# Patient Record
Sex: FEMALE | Race: WHITE | NOT HISPANIC OR LATINO | Employment: UNEMPLOYED | URBAN - METROPOLITAN AREA
[De-identification: names, ages, dates, MRNs, and addresses within clinical notes are randomized per-mention and may not be internally consistent; named-entity substitution may affect disease eponyms.]

---

## 2017-04-12 ENCOUNTER — OFFICE VISIT (OUTPATIENT)
Dept: ORTHOPEDIC SURGERY | Facility: CLINIC | Age: 62
End: 2017-04-12

## 2017-04-12 DIAGNOSIS — M16.11 PRIMARY OSTEOARTHRITIS OF RIGHT HIP: Primary | ICD-10-CM

## 2017-04-12 PROCEDURE — 99213 OFFICE O/P EST LOW 20 MIN: CPT | Performed by: ORTHOPAEDIC SURGERY

## 2017-04-12 RX ORDER — LEVOTHYROXINE SODIUM 0.15 MG/1
150 TABLET ORAL DAILY
COMMUNITY
Start: 2017-02-10

## 2017-04-12 RX ORDER — ERGOCALCIFEROL 1.25 MG/1
50000 CAPSULE ORAL
COMMUNITY
Start: 2017-04-07

## 2017-04-12 RX ORDER — TRAMADOL HYDROCHLORIDE 50 MG/1
TABLET ORAL
COMMUNITY
Start: 2017-04-07 | End: 2017-05-02

## 2017-04-12 RX ORDER — DULOXETIN HYDROCHLORIDE 60 MG/1
CAPSULE, DELAYED RELEASE ORAL
Status: ON HOLD | COMMUNITY
Start: 2017-04-07 | End: 2017-05-16 | Stop reason: ALTCHOICE

## 2017-04-12 RX ORDER — CLOTRIMAZOLE 1 %
CREAM (GRAM) TOPICAL DAILY PRN
COMMUNITY
Start: 2017-03-17

## 2017-04-12 RX ORDER — CYCLOBENZAPRINE HCL 10 MG
TABLET ORAL
COMMUNITY
Start: 2017-04-07 | End: 2017-05-02

## 2017-04-12 RX ORDER — GABAPENTIN 600 MG/1
600 TABLET ORAL 4 TIMES DAILY
COMMUNITY
Start: 2017-04-07 | End: 2017-06-01 | Stop reason: SDUPTHER

## 2017-04-12 NOTE — PROGRESS NOTES
Subjective: Right hip pain     Patient ID: Anderson Hernandez is a 62 y.o. female.    Chief Complaint:    History of Present Illness patient known to me having previously treated for osteoarthritis of the right hip that failed to respond to nonoperative management is scheduled for total hip arthroplasty when she had a change of heart and cancel the surgery.  She since moved to [place] but her pain is persisted despite all treatment modalities and she presents today to schedule the surgery.  Again she states she canceled the last time because she is uncertain as to whether she wanted to proceed with the surgery.  His I told her the decision to proceed is hers to make.  She now is ready to proceed with surgery as she states she has pain constantly in that hip with activity and at rest describing is anywhere from 8-10 out of 10.  Ambulates with an outpouching gait using a cane.  Pain is quite disabling interfering with any activity that she wishes proceed with.       Social History     Occupational History   • Not on file.     Social History Main Topics   • Smoking status: Never Smoker   • Smokeless tobacco: Never Used   • Alcohol use No   • Drug use: No   • Sexual activity: Defer      Review of Systems   Constitutional: Negative for chills, diaphoresis, fever and unexpected weight change.   HENT: Negative for hearing loss, nosebleeds, sore throat and tinnitus.    Eyes: Negative for pain and visual disturbance.   Respiratory: Negative for cough, shortness of breath and wheezing.    Cardiovascular: Negative for chest pain and palpitations.   Gastrointestinal: Negative for abdominal pain, diarrhea, nausea and vomiting.   Endocrine: Negative for cold intolerance, heat intolerance and polydipsia.   Genitourinary: Negative for difficulty urinating, dysuria and hematuria.   Musculoskeletal: Positive for arthralgias and myalgias. Negative for joint swelling.   Skin: Negative for rash and wound.   Allergic/Immunologic: Negative  for environmental allergies.   Neurological: Negative for dizziness, syncope and numbness.   Hematological: Does not bruise/bleed easily.   Psychiatric/Behavioral: Negative for dysphoric mood and sleep disturbance. The patient is not nervous/anxious.    All other systems reviewed and are negative.        Past Medical History:   Diagnosis Date   • Fibromyalgia    • Knee pain    • Shoulder pain    • Stomach ulcer    • Tendinitis of knee      Past Surgical History:   Procedure Laterality Date   • CARPAL TUNNEL RELEASE     • GALLBLADDER SURGERY     • KNEE SURGERY     • PARTIAL HYSTERECTOMY       Family History   Problem Relation Age of Onset   • Lung disease Father    • Cancer Father    • Cancer Sister    • Lung disease Brother    • Cancer Brother    • Broken bones Maternal Grandmother          Objective:  There were no vitals filed for this visit.  There were no vitals filed for this visit.  There is no height or weight on file to calculate BMI.       Ortho Exam  I reviewed the x-rays of the pelvis that were taken previously again shows end-stage degenerative arthritis of the right hip.  Exam shows marked pain with internal rotation which is less than 10°.  External rotation is approximate 40° but painful.  Flexion about 60° with mild pain 0° of extension.  No sensory loss in that right lower extremity.  Calf is nontender and shows no swelling.  His good distal pulses no motor deficit no sensory loss in the lower extremity.  Her hip abductors measure 4 out of 5 as does her quads.    Assessment:       1. Primary osteoarthritis of right hip          Plan:      discussed the surgery with her again in detail regarding risk and benefits length of recovery and she agrees to proceed tenderness set up for May 16      Work Status:    ABDIRIZAK query complete.    Orders:  No orders of the defined types were placed in this encounter.      Medications:  New Medications Ordered This Visit   Medications   • gabapentin (NEURONTIN) 600  MG tablet   • vitamin D (ERGOCALCIFEROL) 71753 UNITS capsule capsule   • DULoxetine (CYMBALTA) 60 MG capsule   • cyclobenzaprine (FLEXERIL) 10 MG tablet   • clotrimazole (LOTRIMIN) 1 % cream   • traMADol (ULTRAM) 50 MG tablet   • levothyroxine (SYNTHROID, LEVOTHROID) 150 MCG tablet       Followup:  Return in about 33 days (around 5/15/2017).          Dragon transcription disclaimer     Much of this encounter note is an electronic transcription/translation of spoken language to printed text. The electronic translation of spoken language may permit erroneous, or at times, nonsensical words or phrases to be inadvertently transcribed. Although I have reviewed the note for such errors, some may still exist.

## 2017-04-15 ENCOUNTER — PREP FOR SURGERY (OUTPATIENT)
Dept: ORTHOPEDIC SURGERY | Facility: CLINIC | Age: 62
End: 2017-04-15

## 2017-04-15 DIAGNOSIS — M16.11 OSTEOARTHRITIS OF ONE HIP, RIGHT: Primary | ICD-10-CM

## 2017-04-15 RX ORDER — OXYCODONE HCL 10 MG/1
10 TABLET, FILM COATED, EXTENDED RELEASE ORAL ONCE
Status: CANCELLED | OUTPATIENT
Start: 2017-05-16

## 2017-04-15 RX ORDER — ACETAMINOPHEN 10 MG/ML
1000 INJECTION, SOLUTION INTRAVENOUS ONCE
Status: CANCELLED | OUTPATIENT
Start: 2017-05-16

## 2017-04-15 RX ORDER — CELECOXIB 100 MG/1
400 CAPSULE ORAL ONCE
Status: CANCELLED | OUTPATIENT
Start: 2017-05-16

## 2017-04-15 RX ORDER — PREGABALIN 75 MG/1
150 CAPSULE ORAL ONCE
Status: CANCELLED | OUTPATIENT
Start: 2017-05-16

## 2017-04-15 RX ORDER — PANTOPRAZOLE SODIUM 20 MG/1
20 TABLET, DELAYED RELEASE ORAL ONCE
Status: CANCELLED | OUTPATIENT
Start: 2017-05-16

## 2017-04-25 ENCOUNTER — PREP FOR SURGERY (OUTPATIENT)
Dept: ORTHOPEDIC SURGERY | Facility: CLINIC | Age: 62
End: 2017-04-25

## 2017-04-25 DIAGNOSIS — M16.11 OSTEOARTHRITIS OF ONE HIP, RIGHT: Primary | ICD-10-CM

## 2017-05-02 ENCOUNTER — APPOINTMENT (OUTPATIENT)
Dept: PREADMISSION TESTING | Facility: HOSPITAL | Age: 62
End: 2017-05-02

## 2017-05-02 ENCOUNTER — HOSPITAL ENCOUNTER (OUTPATIENT)
Dept: GENERAL RADIOLOGY | Facility: HOSPITAL | Age: 62
Discharge: HOME | End: 2017-05-02
Admitting: ORTHOPAEDIC SURGERY

## 2017-05-02 VITALS
DIASTOLIC BLOOD PRESSURE: 84 MMHG | RESPIRATION RATE: 16 BRPM | HEIGHT: 60 IN | BODY MASS INDEX: 35.93 KG/M2 | SYSTOLIC BLOOD PRESSURE: 149 MMHG | WEIGHT: 183 LBS | HEART RATE: 73 BPM | OXYGEN SATURATION: 99 %

## 2017-05-02 DIAGNOSIS — M16.11 PRIMARY OSTEOARTHRITIS OF RIGHT HIP: Primary | ICD-10-CM

## 2017-05-02 DIAGNOSIS — M16.11 OSTEOARTHRITIS OF ONE HIP, RIGHT: ICD-10-CM

## 2017-05-02 LAB
ABO GROUP BLD: NORMAL
ABO GROUP BLD: NORMAL
ANION GAP SERPL CALCULATED.3IONS-SCNC: 13.1 MMOL/L
BACTERIA UR QL AUTO: ABNORMAL /HPF
BASOPHILS # BLD AUTO: 0.07 10*3/MM3 (ref 0–0.2)
BASOPHILS NFR BLD AUTO: 1.6 % (ref 0–2)
BILIRUB UR QL STRIP: NEGATIVE
BLD GP AB SCN SERPL QL: NEGATIVE
BUN BLD-MCNC: 8 MG/DL (ref 8–23)
BUN/CREAT SERPL: 10.4 (ref 7–25)
CALCIUM SPEC-SCNC: 9 MG/DL (ref 8.8–10.5)
CHLORIDE SERPL-SCNC: 102 MMOL/L (ref 98–107)
CLARITY UR: CLEAR
CO2 SERPL-SCNC: 25.9 MMOL/L (ref 22–29)
COLOR UR: YELLOW
CREAT BLD-MCNC: 0.77 MG/DL (ref 0.57–1)
DEPRECATED RDW RBC AUTO: 40.9 FL (ref 37–54)
EOSINOPHIL # BLD AUTO: 0.22 10*3/MM3 (ref 0.1–0.3)
EOSINOPHIL NFR BLD AUTO: 4.9 % (ref 0–4)
ERYTHROCYTE [DISTWIDTH] IN BLOOD BY AUTOMATED COUNT: 12.3 % (ref 11.5–14.5)
GFR SERPL CREATININE-BSD FRML MDRD: 76 ML/MIN/1.73
GLUCOSE BLD-MCNC: 115 MG/DL (ref 65–99)
GLUCOSE UR STRIP-MCNC: NEGATIVE MG/DL
HCT VFR BLD AUTO: 40.3 % (ref 37–47)
HGB BLD-MCNC: 13.9 G/DL (ref 12–16)
HGB UR QL STRIP.AUTO: NEGATIVE
HYALINE CASTS UR QL AUTO: ABNORMAL /LPF
IMM GRANULOCYTES # BLD: 0.01 10*3/MM3 (ref 0–0.03)
IMM GRANULOCYTES NFR BLD: 0.2 % (ref 0–0.5)
INR PPP: 1.02 (ref 0.9–1.1)
KETONES UR QL STRIP: NEGATIVE
LEUKOCYTE ESTERASE UR QL STRIP.AUTO: ABNORMAL
LYMPHOCYTES # BLD AUTO: 1.3 10*3/MM3 (ref 0.6–4.8)
LYMPHOCYTES NFR BLD AUTO: 29.1 % (ref 20–45)
MCH RBC QN AUTO: 31.2 PG (ref 27–31)
MCHC RBC AUTO-ENTMCNC: 34.5 G/DL (ref 31–37)
MCV RBC AUTO: 90.4 FL (ref 81–99)
MONOCYTES # BLD AUTO: 0.36 10*3/MM3 (ref 0–1)
MONOCYTES NFR BLD AUTO: 8.1 % (ref 3–8)
NEUTROPHILS # BLD AUTO: 2.5 10*3/MM3 (ref 1.5–8.3)
NEUTROPHILS NFR BLD AUTO: 56.1 % (ref 45–70)
NITRITE UR QL STRIP: NEGATIVE
NRBC BLD MANUAL-RTO: 0 /100 WBC (ref 0–0)
PH UR STRIP.AUTO: 5.5 [PH] (ref 4.5–8)
PLATELET # BLD AUTO: 234 10*3/MM3 (ref 140–500)
PMV BLD AUTO: 10.6 FL (ref 7.4–10.4)
POTASSIUM BLD-SCNC: 3.8 MMOL/L (ref 3.5–5.2)
PROT UR QL STRIP: NEGATIVE
PROTHROMBIN TIME: 13.4 SECONDS (ref 12.1–15)
RBC # BLD AUTO: 4.46 10*6/MM3 (ref 4.2–5.4)
RBC # UR: ABNORMAL /HPF
REF LAB TEST METHOD: ABNORMAL
RH BLD: POSITIVE
RH BLD: POSITIVE
SODIUM BLD-SCNC: 141 MMOL/L (ref 136–145)
SP GR UR STRIP: 1.01 (ref 1–1.03)
SQUAMOUS #/AREA URNS HPF: ABNORMAL /HPF
UROBILINOGEN UR QL STRIP: ABNORMAL
WBC NRBC COR # BLD: 4.46 10*3/MM3 (ref 4.8–10.8)
WBC UR QL AUTO: ABNORMAL /HPF

## 2017-05-02 PROCEDURE — 80048 BASIC METABOLIC PNL TOTAL CA: CPT | Performed by: ORTHOPAEDIC SURGERY

## 2017-05-02 PROCEDURE — 86900 BLOOD TYPING SEROLOGIC ABO: CPT

## 2017-05-02 PROCEDURE — 87081 CULTURE SCREEN ONLY: CPT | Performed by: ORTHOPAEDIC SURGERY

## 2017-05-02 PROCEDURE — 85610 PROTHROMBIN TIME: CPT | Performed by: ORTHOPAEDIC SURGERY

## 2017-05-02 PROCEDURE — 71020 HC CHEST PA AND LATERAL: CPT

## 2017-05-02 PROCEDURE — 86850 RBC ANTIBODY SCREEN: CPT | Performed by: ORTHOPAEDIC SURGERY

## 2017-05-02 PROCEDURE — 86901 BLOOD TYPING SEROLOGIC RH(D): CPT

## 2017-05-02 PROCEDURE — 85025 COMPLETE CBC W/AUTO DIFF WBC: CPT | Performed by: ORTHOPAEDIC SURGERY

## 2017-05-02 PROCEDURE — 93010 ELECTROCARDIOGRAM REPORT: CPT | Performed by: INTERNAL MEDICINE

## 2017-05-02 PROCEDURE — 86900 BLOOD TYPING SEROLOGIC ABO: CPT | Performed by: ORTHOPAEDIC SURGERY

## 2017-05-02 PROCEDURE — 93005 ELECTROCARDIOGRAM TRACING: CPT

## 2017-05-02 PROCEDURE — 81001 URINALYSIS AUTO W/SCOPE: CPT | Performed by: ORTHOPAEDIC SURGERY

## 2017-05-02 PROCEDURE — 36415 COLL VENOUS BLD VENIPUNCTURE: CPT

## 2017-05-02 PROCEDURE — 86901 BLOOD TYPING SEROLOGIC RH(D): CPT | Performed by: ORTHOPAEDIC SURGERY

## 2017-05-02 RX ORDER — PANTOPRAZOLE SODIUM 40 MG/1
40 TABLET, DELAYED RELEASE ORAL DAILY
COMMUNITY

## 2017-05-04 LAB — MRSA SPEC QL CULT: NORMAL

## 2017-05-15 ENCOUNTER — ANESTHESIA EVENT (OUTPATIENT)
Dept: PERIOP | Facility: HOSPITAL | Age: 62
End: 2017-05-15

## 2017-05-15 ENCOUNTER — OFFICE VISIT (OUTPATIENT)
Dept: ORTHOPEDIC SURGERY | Facility: CLINIC | Age: 62
End: 2017-05-15

## 2017-05-15 VITALS — BODY MASS INDEX: 35.14 KG/M2 | WEIGHT: 179 LBS | HEIGHT: 60 IN

## 2017-05-15 DIAGNOSIS — M16.11 OSTEOARTHRITIS OF ONE HIP, RIGHT: Primary | ICD-10-CM

## 2017-05-15 PROCEDURE — S0260 H&P FOR SURGERY: HCPCS | Performed by: ORTHOPAEDIC SURGERY

## 2017-05-16 ENCOUNTER — ANESTHESIA (OUTPATIENT)
Dept: PERIOP | Facility: HOSPITAL | Age: 62
End: 2017-05-16

## 2017-05-16 ENCOUNTER — HOSPITAL ENCOUNTER (INPATIENT)
Facility: HOSPITAL | Age: 62
LOS: 2 days | Discharge: HOME | End: 2017-05-18
Attending: ORTHOPAEDIC SURGERY | Admitting: ORTHOPAEDIC SURGERY

## 2017-05-16 ENCOUNTER — APPOINTMENT (OUTPATIENT)
Dept: GENERAL RADIOLOGY | Facility: HOSPITAL | Age: 62
End: 2017-05-16

## 2017-05-16 DIAGNOSIS — M16.11 OSTEOARTHRITIS OF ONE HIP, RIGHT: ICD-10-CM

## 2017-05-16 PROBLEM — M16.9 OSTEOARTHRITIS OF HIP: Status: ACTIVE | Noted: 2017-05-16

## 2017-05-16 PROCEDURE — 73501 X-RAY EXAM HIP UNI 1 VIEW: CPT

## 2017-05-16 PROCEDURE — 0SR904A REPLACEMENT OF RIGHT HIP JOINT WITH CERAMIC ON POLYETHYLENE SYNTHETIC SUBSTITUTE, UNCEMENTED, OPEN APPROACH: ICD-10-PCS | Performed by: ORTHOPAEDIC SURGERY

## 2017-05-16 PROCEDURE — 25010000002 DIPHENHYDRAMINE PER 50 MG: Performed by: NURSE ANESTHETIST, CERTIFIED REGISTERED

## 2017-05-16 PROCEDURE — 25010000002 PROPOFOL 10 MG/ML EMULSION: Performed by: NURSE ANESTHETIST, CERTIFIED REGISTERED

## 2017-05-16 PROCEDURE — 25010000002 ONDANSETRON PER 1 MG

## 2017-05-16 PROCEDURE — C1776 JOINT DEVICE (IMPLANTABLE): HCPCS | Performed by: ORTHOPAEDIC SURGERY

## 2017-05-16 PROCEDURE — 25010000002 MIDAZOLAM PER 1 MG: Performed by: NURSE ANESTHETIST, CERTIFIED REGISTERED

## 2017-05-16 PROCEDURE — 27130 TOTAL HIP ARTHROPLASTY: CPT | Performed by: ORTHOPAEDIC SURGERY

## 2017-05-16 PROCEDURE — 99232 SBSQ HOSP IP/OBS MODERATE 35: CPT | Performed by: INTERNAL MEDICINE

## 2017-05-16 PROCEDURE — 25010000002 DEXAMETHASONE PER 1 MG: Performed by: NURSE ANESTHETIST, CERTIFIED REGISTERED

## 2017-05-16 PROCEDURE — 25010000003 CEFAZOLIN PER 500 MG: Performed by: ORTHOPAEDIC SURGERY

## 2017-05-16 PROCEDURE — 25010000003 CEFAZOLIN PER 500 MG: Performed by: NURSE ANESTHETIST, CERTIFIED REGISTERED

## 2017-05-16 PROCEDURE — 25010000002 ONDANSETRON PER 1 MG: Performed by: ORTHOPAEDIC SURGERY

## 2017-05-16 PROCEDURE — 94799 UNLISTED PULMONARY SVC/PX: CPT

## 2017-05-16 DEVICE — PINNACLE HIP SOLUTIONS ALTRX POLYETHYLENE ACETABULAR LINER NEUTRAL 32MM ID 50MM OD
Type: IMPLANTABLE DEVICE | Site: ACETABULUM | Status: FUNCTIONAL
Brand: PINNACLE ALTRX

## 2017-05-16 DEVICE — BIOLOX DELTA CERAMIC FEMORAL HEAD 32MM DIA +5.0 12/14 TAPER
Type: IMPLANTABLE DEVICE | Site: HIP | Status: FUNCTIONAL
Brand: BIOLOX DELTA

## 2017-05-16 DEVICE — TOTL HIP COA DEPUY 9641334: Type: IMPLANTABLE DEVICE | Site: ACETABULUM | Status: FUNCTIONAL

## 2017-05-16 DEVICE — PINNACLE GRIPTION ACETABULAR SHELL SECTOR 50MM OD
Type: IMPLANTABLE DEVICE | Site: ACETABULUM | Status: FUNCTIONAL
Brand: PINNACLE GRIPTION

## 2017-05-16 DEVICE — SUMMIT FEMORAL STEM 12/14 TAPER TAPERED W/DUOFIX HA SIZE 4 STD 140MM
Type: IMPLANTABLE DEVICE | Site: TROCHANTER | Status: FUNCTIONAL
Brand: SUMMIT

## 2017-05-16 DEVICE — PINNACLE CANCELLOUS BONE SCREW 6.5MM X 25MM
Type: IMPLANTABLE DEVICE | Site: ACETABULUM | Status: FUNCTIONAL
Brand: PINNACLE

## 2017-05-16 RX ORDER — SCOLOPAMINE TRANSDERMAL SYSTEM 1 MG/1
1 PATCH, EXTENDED RELEASE TRANSDERMAL ONCE
Status: DISCONTINUED | OUTPATIENT
Start: 2017-05-16 | End: 2017-05-17

## 2017-05-16 RX ORDER — SENNA AND DOCUSATE SODIUM 50; 8.6 MG/1; MG/1
2 TABLET, FILM COATED ORAL 2 TIMES DAILY
Status: DISCONTINUED | OUTPATIENT
Start: 2017-05-16 | End: 2017-05-18 | Stop reason: HOSPADM

## 2017-05-16 RX ORDER — HYDROMORPHONE HCL 110MG/55ML
0.5 PATIENT CONTROLLED ANALGESIA SYRINGE INTRAVENOUS
Status: CANCELLED | OUTPATIENT
Start: 2017-05-16

## 2017-05-16 RX ORDER — DIPHENHYDRAMINE HYDROCHLORIDE 50 MG/ML
INJECTION INTRAMUSCULAR; INTRAVENOUS AS NEEDED
Status: DISCONTINUED | OUTPATIENT
Start: 2017-05-16 | End: 2017-05-16 | Stop reason: SURG

## 2017-05-16 RX ORDER — OXYCODONE HCL 10 MG/1
10 TABLET, FILM COATED, EXTENDED RELEASE ORAL ONCE
Status: COMPLETED | OUTPATIENT
Start: 2017-05-16 | End: 2017-05-16

## 2017-05-16 RX ORDER — MAGNESIUM HYDROXIDE 1200 MG/15ML
LIQUID ORAL AS NEEDED
Status: DISCONTINUED | OUTPATIENT
Start: 2017-05-16 | End: 2017-05-16 | Stop reason: HOSPADM

## 2017-05-16 RX ORDER — NALOXONE HCL 0.4 MG/ML
0.4 VIAL (ML) INJECTION AS NEEDED
Status: DISCONTINUED | OUTPATIENT
Start: 2017-05-16 | End: 2017-05-16 | Stop reason: HOSPADM

## 2017-05-16 RX ORDER — BISACODYL 5 MG/1
10 TABLET, DELAYED RELEASE ORAL DAILY PRN
Status: DISCONTINUED | OUTPATIENT
Start: 2017-05-16 | End: 2017-05-18 | Stop reason: HOSPADM

## 2017-05-16 RX ORDER — PREGABALIN 75 MG/1
150 CAPSULE ORAL ONCE
Status: COMPLETED | OUTPATIENT
Start: 2017-05-16 | End: 2017-05-16

## 2017-05-16 RX ORDER — GABAPENTIN 300 MG/1
600 CAPSULE ORAL 4 TIMES DAILY
Status: DISCONTINUED | OUTPATIENT
Start: 2017-05-16 | End: 2017-05-18 | Stop reason: HOSPADM

## 2017-05-16 RX ORDER — SODIUM CHLORIDE 0.9 % (FLUSH) 0.9 %
1-10 SYRINGE (ML) INJECTION AS NEEDED
Status: DISCONTINUED | OUTPATIENT
Start: 2017-05-16 | End: 2017-05-16 | Stop reason: HOSPADM

## 2017-05-16 RX ORDER — ONDANSETRON 2 MG/ML
INJECTION INTRAMUSCULAR; INTRAVENOUS
Status: COMPLETED
Start: 2017-05-16 | End: 2017-05-16

## 2017-05-16 RX ORDER — PROPOFOL 10 MG/ML
VIAL (ML) INTRAVENOUS CONTINUOUS PRN
Status: DISCONTINUED | OUTPATIENT
Start: 2017-05-16 | End: 2017-05-16 | Stop reason: SURG

## 2017-05-16 RX ORDER — ASPIRIN 325 MG
325 TABLET ORAL EVERY 12 HOURS
Status: DISCONTINUED | OUTPATIENT
Start: 2017-05-16 | End: 2017-05-18 | Stop reason: HOSPADM

## 2017-05-16 RX ORDER — MIDAZOLAM HYDROCHLORIDE 1 MG/ML
2 INJECTION INTRAMUSCULAR; INTRAVENOUS
Status: DISCONTINUED | OUTPATIENT
Start: 2017-05-16 | End: 2017-05-16 | Stop reason: HOSPADM

## 2017-05-16 RX ORDER — LEVOTHYROXINE SODIUM 0.15 MG/1
150 TABLET ORAL DAILY
Status: DISCONTINUED | OUTPATIENT
Start: 2017-05-16 | End: 2017-05-18 | Stop reason: HOSPADM

## 2017-05-16 RX ORDER — NAPROXEN SODIUM 220 MG
220 TABLET ORAL AS NEEDED
COMMUNITY
End: 2017-05-18 | Stop reason: HOSPADM

## 2017-05-16 RX ORDER — TRAMADOL HYDROCHLORIDE 50 MG/1
50 TABLET ORAL EVERY 6 HOURS PRN
Status: CANCELLED | OUTPATIENT
Start: 2017-05-16 | End: 2017-05-26

## 2017-05-16 RX ORDER — CEFAZOLIN SODIUM 1 G/3ML
2 INJECTION, POWDER, FOR SOLUTION INTRAMUSCULAR; INTRAVENOUS ONCE
Status: DISCONTINUED | OUTPATIENT
Start: 2017-05-16 | End: 2017-05-16 | Stop reason: HOSPADM

## 2017-05-16 RX ORDER — ONDANSETRON 2 MG/ML
4 INJECTION INTRAMUSCULAR; INTRAVENOUS EVERY 6 HOURS PRN
Status: DISCONTINUED | OUTPATIENT
Start: 2017-05-16 | End: 2017-05-18 | Stop reason: HOSPADM

## 2017-05-16 RX ORDER — CELECOXIB 100 MG/1
400 CAPSULE ORAL ONCE
Status: COMPLETED | OUTPATIENT
Start: 2017-05-16 | End: 2017-05-16

## 2017-05-16 RX ORDER — ONDANSETRON 2 MG/ML
4 INJECTION INTRAMUSCULAR; INTRAVENOUS ONCE AS NEEDED
Status: DISCONTINUED | OUTPATIENT
Start: 2017-05-16 | End: 2017-05-16 | Stop reason: HOSPADM

## 2017-05-16 RX ORDER — LIDOCAINE HYDROCHLORIDE 10 MG/ML
0.5 INJECTION, SOLUTION EPIDURAL; INFILTRATION; INTRACAUDAL; PERINEURAL ONCE AS NEEDED
Status: COMPLETED | OUTPATIENT
Start: 2017-05-16 | End: 2017-05-16

## 2017-05-16 RX ORDER — ACETAMINOPHEN 10 MG/ML
1000 INJECTION, SOLUTION INTRAVENOUS ONCE
Status: COMPLETED | OUTPATIENT
Start: 2017-05-16 | End: 2017-05-16

## 2017-05-16 RX ORDER — FAMOTIDINE 10 MG/ML
20 INJECTION, SOLUTION INTRAVENOUS
Status: DISCONTINUED | OUTPATIENT
Start: 2017-05-16 | End: 2017-05-16 | Stop reason: HOSPADM

## 2017-05-16 RX ORDER — FLUTICASONE PROPIONATE 50 MCG
1 SPRAY, SUSPENSION (ML) NASAL AS NEEDED
Status: DISCONTINUED | OUTPATIENT
Start: 2017-05-16 | End: 2017-05-18 | Stop reason: HOSPADM

## 2017-05-16 RX ORDER — CEFAZOLIN SODIUM 1 G/3ML
INJECTION, POWDER, FOR SOLUTION INTRAMUSCULAR; INTRAVENOUS
Status: DISPENSED
Start: 2017-05-16 | End: 2017-05-16

## 2017-05-16 RX ORDER — BISACODYL 10 MG
10 SUPPOSITORY, RECTAL RECTAL DAILY PRN
Status: DISCONTINUED | OUTPATIENT
Start: 2017-05-16 | End: 2017-05-18 | Stop reason: HOSPADM

## 2017-05-16 RX ORDER — PANTOPRAZOLE SODIUM 20 MG/1
20 TABLET, DELAYED RELEASE ORAL ONCE
Status: COMPLETED | OUTPATIENT
Start: 2017-05-16 | End: 2017-05-16

## 2017-05-16 RX ORDER — ACETAMINOPHEN 500 MG
1000 TABLET ORAL EVERY 6 HOURS
Status: DISCONTINUED | OUTPATIENT
Start: 2017-05-16 | End: 2017-05-18 | Stop reason: HOSPADM

## 2017-05-16 RX ORDER — ONDANSETRON 2 MG/ML
4 INJECTION INTRAMUSCULAR; INTRAVENOUS ONCE AS NEEDED
Status: COMPLETED | OUTPATIENT
Start: 2017-05-16 | End: 2017-05-16

## 2017-05-16 RX ORDER — CLOTRIMAZOLE 1 %
CREAM (GRAM) TOPICAL DAILY PRN
Status: DISCONTINUED | OUTPATIENT
Start: 2017-05-16 | End: 2017-05-18 | Stop reason: HOSPADM

## 2017-05-16 RX ORDER — SODIUM CHLORIDE, SODIUM LACTATE, POTASSIUM CHLORIDE, CALCIUM CHLORIDE 600; 310; 30; 20 MG/100ML; MG/100ML; MG/100ML; MG/100ML
9 INJECTION, SOLUTION INTRAVENOUS CONTINUOUS
Status: DISCONTINUED | OUTPATIENT
Start: 2017-05-16 | End: 2017-05-16

## 2017-05-16 RX ORDER — DEXAMETHASONE SODIUM PHOSPHATE 4 MG/ML
8 INJECTION, SOLUTION INTRA-ARTICULAR; INTRALESIONAL; INTRAMUSCULAR; INTRAVENOUS; SOFT TISSUE ONCE
Status: COMPLETED | OUTPATIENT
Start: 2017-05-16 | End: 2017-05-16

## 2017-05-16 RX ORDER — MIDAZOLAM HYDROCHLORIDE 1 MG/ML
1 INJECTION INTRAMUSCULAR; INTRAVENOUS
Status: DISCONTINUED | OUTPATIENT
Start: 2017-05-16 | End: 2017-05-16 | Stop reason: HOSPADM

## 2017-05-16 RX ORDER — BUPIVACAINE HYDROCHLORIDE 5 MG/ML
INJECTION, SOLUTION EPIDURAL; INTRACAUDAL AS NEEDED
Status: DISCONTINUED | OUTPATIENT
Start: 2017-05-16 | End: 2017-05-16 | Stop reason: SURG

## 2017-05-16 RX ORDER — SODIUM CHLORIDE, SODIUM LACTATE, POTASSIUM CHLORIDE, CALCIUM CHLORIDE 600; 310; 30; 20 MG/100ML; MG/100ML; MG/100ML; MG/100ML
50 INJECTION, SOLUTION INTRAVENOUS CONTINUOUS
Status: DISCONTINUED | OUTPATIENT
Start: 2017-05-16 | End: 2017-05-16

## 2017-05-16 RX ORDER — PANTOPRAZOLE SODIUM 40 MG/1
40 TABLET, DELAYED RELEASE ORAL DAILY
Status: DISCONTINUED | OUTPATIENT
Start: 2017-05-16 | End: 2017-05-18 | Stop reason: HOSPADM

## 2017-05-16 RX ORDER — OXYCODONE HYDROCHLORIDE 5 MG/1
10 TABLET ORAL EVERY 4 HOURS PRN
Status: DISCONTINUED | OUTPATIENT
Start: 2017-05-16 | End: 2017-05-18 | Stop reason: HOSPADM

## 2017-05-16 RX ADMIN — FAMOTIDINE 20 MG: 10 INJECTION INTRAVENOUS at 08:28

## 2017-05-16 RX ADMIN — LIDOCAINE HYDROCHLORIDE 0.1 ML: 10 INJECTION, SOLUTION EPIDURAL; INFILTRATION; INTRACAUDAL; PERINEURAL at 06:50

## 2017-05-16 RX ADMIN — DEXAMETHASONE SODIUM PHOSPHATE 8 MG: 4 INJECTION, SOLUTION INTRAMUSCULAR; INTRAVENOUS at 08:28

## 2017-05-16 RX ADMIN — DIPHENHYDRAMINE HYDROCHLORIDE 12.5 MG: 50 INJECTION INTRAMUSCULAR; INTRAVENOUS at 10:20

## 2017-05-16 RX ADMIN — MIDAZOLAM HYDROCHLORIDE 1 MG: 1 INJECTION, SOLUTION INTRAMUSCULAR; INTRAVENOUS at 08:28

## 2017-05-16 RX ADMIN — PROPOFOL 25 MCG/KG/MIN: 10 INJECTION, EMULSION INTRAVENOUS at 10:36

## 2017-05-16 RX ADMIN — ACETAMINOPHEN 1000 MG: 10 INJECTION, SOLUTION INTRAVENOUS at 07:52

## 2017-05-16 RX ADMIN — PANTOPRAZOLE SODIUM 20 MG: 20 TABLET, DELAYED RELEASE ORAL at 07:01

## 2017-05-16 RX ADMIN — GABAPENTIN 600 MG: 300 CAPSULE ORAL at 21:38

## 2017-05-16 RX ADMIN — OXYCODONE HYDROCHLORIDE 10 MG: 5 TABLET ORAL at 14:07

## 2017-05-16 RX ADMIN — ACETAMINOPHEN 1000 MG: 500 TABLET, FILM COATED ORAL at 21:38

## 2017-05-16 RX ADMIN — DIPHENHYDRAMINE HYDROCHLORIDE 12.5 MG: 50 INJECTION INTRAMUSCULAR; INTRAVENOUS at 11:15

## 2017-05-16 RX ADMIN — OXYCODONE HYDROCHLORIDE 10 MG: 5 TABLET ORAL at 21:38

## 2017-05-16 RX ADMIN — CELECOXIB 400 MG: 100 CAPSULE ORAL at 07:00

## 2017-05-16 RX ADMIN — GABAPENTIN 600 MG: 300 CAPSULE ORAL at 17:39

## 2017-05-16 RX ADMIN — CEFAZOLIN SODIUM 2 G: 2 SOLUTION INTRAVENOUS at 10:00

## 2017-05-16 RX ADMIN — SODIUM CHLORIDE, POTASSIUM CHLORIDE, SODIUM LACTATE AND CALCIUM CHLORIDE: 600; 310; 30; 20 INJECTION, SOLUTION INTRAVENOUS at 09:30

## 2017-05-16 RX ADMIN — OXYCODONE HYDROCHLORIDE 10 MG: 5 TABLET ORAL at 17:38

## 2017-05-16 RX ADMIN — PANTOPRAZOLE SODIUM 40 MG: 40 TABLET, DELAYED RELEASE ORAL at 17:49

## 2017-05-16 RX ADMIN — SODIUM CHLORIDE 1000 MG: 9 INJECTION, SOLUTION INTRAVENOUS at 11:38

## 2017-05-16 RX ADMIN — SCOPALAMINE 1 PATCH: 1 PATCH, EXTENDED RELEASE TRANSDERMAL at 08:28

## 2017-05-16 RX ADMIN — ASPIRIN 325 MG: 325 TABLET, COATED ORAL at 17:39

## 2017-05-16 RX ADMIN — BUPIVACAINE HYDROCHLORIDE 3 ML: 5 INJECTION, SOLUTION EPIDURAL; INTRACAUDAL; PERINEURAL at 09:15

## 2017-05-16 RX ADMIN — ONDANSETRON 4 MG: 2 INJECTION INTRAMUSCULAR; INTRAVENOUS at 08:28

## 2017-05-16 RX ADMIN — DOCUSATE SODIUM AND SENNOSIDES 2 TABLET: 8.6; 5 TABLET, FILM COATED ORAL at 17:39

## 2017-05-16 RX ADMIN — CEFAZOLIN 2 G: 1 INJECTION, POWDER, FOR SOLUTION INTRAMUSCULAR; INTRAVENOUS at 17:40

## 2017-05-16 RX ADMIN — OXYCODONE HYDROCHLORIDE 10 MG: 10 TABLET, FILM COATED, EXTENDED RELEASE ORAL at 07:00

## 2017-05-16 RX ADMIN — SODIUM CHLORIDE 1000 MG: 9 INJECTION, SOLUTION INTRAVENOUS at 10:15

## 2017-05-16 RX ADMIN — ONDANSETRON 4 MG: 2 INJECTION, SOLUTION INTRAMUSCULAR; INTRAVENOUS at 15:44

## 2017-05-16 RX ADMIN — LEVOTHYROXINE SODIUM 150 MCG: 150 TABLET ORAL at 17:49

## 2017-05-16 RX ADMIN — PREGABALIN 150 MG: 75 CAPSULE ORAL at 07:00

## 2017-05-16 RX ADMIN — SODIUM CHLORIDE, POTASSIUM CHLORIDE, SODIUM LACTATE AND CALCIUM CHLORIDE 9 ML/HR: 600; 310; 30; 20 INJECTION, SOLUTION INTRAVENOUS at 06:50

## 2017-05-16 RX ADMIN — ONDANSETRON 4 MG: 2 INJECTION, SOLUTION INTRAMUSCULAR; INTRAVENOUS at 08:28

## 2017-05-17 LAB
APTT PPP: 32 SECONDS (ref 24.3–38.1)
DEPRECATED RDW RBC AUTO: 41.4 FL (ref 37–54)
ERYTHROCYTE [DISTWIDTH] IN BLOOD BY AUTOMATED COUNT: 12.4 % (ref 11.5–14.5)
HCT VFR BLD AUTO: 35.2 % (ref 37–47)
HGB BLD-MCNC: 11.7 G/DL (ref 12–16)
LARGE PLATELETS: ABNORMAL
LYMPHOCYTES # BLD MANUAL: 1.04 10*3/MM3 (ref 0.6–4.8)
LYMPHOCYTES NFR BLD MANUAL: 11 % (ref 3–8)
LYMPHOCYTES NFR BLD MANUAL: 8 % (ref 20–45)
MCH RBC QN AUTO: 30.5 PG (ref 27–31)
MCHC RBC AUTO-ENTMCNC: 33.2 G/DL (ref 31–37)
MCV RBC AUTO: 91.7 FL (ref 81–99)
MONOCYTES # BLD AUTO: 1.42 10*3/MM3 (ref 0–1)
NEUTROPHILS # BLD AUTO: 10.49 10*3/MM3 (ref 1.5–8.3)
NEUTROPHILS NFR BLD MANUAL: 77 % (ref 45–70)
NEUTS BAND NFR BLD MANUAL: 4 % (ref 0–5)
PLAT MORPH BLD: NORMAL
PLATELET # BLD AUTO: 238 10*3/MM3 (ref 140–500)
PMV BLD AUTO: 11.2 FL (ref 7.4–10.4)
RBC # BLD AUTO: 3.84 10*6/MM3 (ref 4.2–5.4)
RBC MORPH BLD: NORMAL
WBC MORPH BLD: NORMAL
WBC NRBC COR # BLD: 12.95 10*3/MM3 (ref 4.8–10.8)

## 2017-05-17 PROCEDURE — 85007 BL SMEAR W/DIFF WBC COUNT: CPT | Performed by: ORTHOPAEDIC SURGERY

## 2017-05-17 PROCEDURE — 97116 GAIT TRAINING THERAPY: CPT

## 2017-05-17 PROCEDURE — 99232 SBSQ HOSP IP/OBS MODERATE 35: CPT | Performed by: INTERNAL MEDICINE

## 2017-05-17 PROCEDURE — 25010000003 CEFAZOLIN PER 500 MG: Performed by: ORTHOPAEDIC SURGERY

## 2017-05-17 PROCEDURE — 85025 COMPLETE CBC W/AUTO DIFF WBC: CPT | Performed by: ORTHOPAEDIC SURGERY

## 2017-05-17 PROCEDURE — 97162 PT EVAL MOD COMPLEX 30 MIN: CPT

## 2017-05-17 PROCEDURE — 97535 SELF CARE MNGMENT TRAINING: CPT

## 2017-05-17 PROCEDURE — 94799 UNLISTED PULMONARY SVC/PX: CPT

## 2017-05-17 PROCEDURE — 85730 THROMBOPLASTIN TIME PARTIAL: CPT | Performed by: ORTHOPAEDIC SURGERY

## 2017-05-17 PROCEDURE — 25010000002 ONDANSETRON PER 1 MG: Performed by: ORTHOPAEDIC SURGERY

## 2017-05-17 PROCEDURE — 99024 POSTOP FOLLOW-UP VISIT: CPT | Performed by: ORTHOPAEDIC SURGERY

## 2017-05-17 PROCEDURE — 97165 OT EVAL LOW COMPLEX 30 MIN: CPT

## 2017-05-17 PROCEDURE — 97110 THERAPEUTIC EXERCISES: CPT

## 2017-05-17 RX ADMIN — ACETAMINOPHEN 1000 MG: 500 TABLET, FILM COATED ORAL at 09:14

## 2017-05-17 RX ADMIN — GABAPENTIN 600 MG: 300 CAPSULE ORAL at 12:10

## 2017-05-17 RX ADMIN — ACETAMINOPHEN 1000 MG: 500 TABLET, FILM COATED ORAL at 16:32

## 2017-05-17 RX ADMIN — GABAPENTIN 600 MG: 300 CAPSULE ORAL at 09:13

## 2017-05-17 RX ADMIN — DOCUSATE SODIUM AND SENNOSIDES 2 TABLET: 8.6; 5 TABLET, FILM COATED ORAL at 09:13

## 2017-05-17 RX ADMIN — LEVOTHYROXINE SODIUM 150 MCG: 150 TABLET ORAL at 09:14

## 2017-05-17 RX ADMIN — ACETAMINOPHEN 1000 MG: 500 TABLET, FILM COATED ORAL at 22:28

## 2017-05-17 RX ADMIN — ACETAMINOPHEN 1000 MG: 500 TABLET, FILM COATED ORAL at 04:31

## 2017-05-17 RX ADMIN — OXYCODONE HYDROCHLORIDE 10 MG: 5 TABLET ORAL at 20:42

## 2017-05-17 RX ADMIN — ASPIRIN 325 MG: 325 TABLET, COATED ORAL at 04:31

## 2017-05-17 RX ADMIN — GABAPENTIN 600 MG: 300 CAPSULE ORAL at 20:41

## 2017-05-17 RX ADMIN — ONDANSETRON 4 MG: 2 INJECTION, SOLUTION INTRAMUSCULAR; INTRAVENOUS at 09:29

## 2017-05-17 RX ADMIN — GABAPENTIN 600 MG: 300 CAPSULE ORAL at 18:15

## 2017-05-17 RX ADMIN — OXYCODONE HYDROCHLORIDE 10 MG: 5 TABLET ORAL at 01:38

## 2017-05-17 RX ADMIN — ASPIRIN 325 MG: 325 TABLET, COATED ORAL at 16:33

## 2017-05-17 RX ADMIN — OXYCODONE HYDROCHLORIDE 10 MG: 5 TABLET ORAL at 16:32

## 2017-05-17 RX ADMIN — CEFAZOLIN 2 G: 1 INJECTION, POWDER, FOR SOLUTION INTRAMUSCULAR; INTRAVENOUS at 04:31

## 2017-05-17 RX ADMIN — DOCUSATE SODIUM AND SENNOSIDES 2 TABLET: 8.6; 5 TABLET, FILM COATED ORAL at 18:15

## 2017-05-17 RX ADMIN — OXYCODONE HYDROCHLORIDE 10 MG: 5 TABLET ORAL at 09:55

## 2017-05-17 RX ADMIN — OXYCODONE HYDROCHLORIDE 10 MG: 5 TABLET ORAL at 05:49

## 2017-05-17 RX ADMIN — PANTOPRAZOLE SODIUM 40 MG: 40 TABLET, DELAYED RELEASE ORAL at 09:14

## 2017-05-18 VITALS
OXYGEN SATURATION: 94 % | SYSTOLIC BLOOD PRESSURE: 109 MMHG | WEIGHT: 180.4 LBS | HEIGHT: 60 IN | TEMPERATURE: 98 F | BODY MASS INDEX: 35.42 KG/M2 | RESPIRATION RATE: 18 BRPM | HEART RATE: 98 BPM | DIASTOLIC BLOOD PRESSURE: 68 MMHG

## 2017-05-18 PROCEDURE — 97535 SELF CARE MNGMENT TRAINING: CPT

## 2017-05-18 PROCEDURE — 99024 POSTOP FOLLOW-UP VISIT: CPT | Performed by: ORTHOPAEDIC SURGERY

## 2017-05-18 PROCEDURE — 97116 GAIT TRAINING THERAPY: CPT

## 2017-05-18 PROCEDURE — 97110 THERAPEUTIC EXERCISES: CPT

## 2017-05-18 RX ORDER — ASPIRIN 325 MG
325 TABLET ORAL EVERY 12 HOURS
Qty: 60 TABLET | Refills: 0 | Status: SHIPPED | OUTPATIENT
Start: 2017-05-18 | End: 2017-06-21

## 2017-05-18 RX ORDER — ONDANSETRON 4 MG/1
4 TABLET, ORALLY DISINTEGRATING ORAL EVERY 8 HOURS PRN
Qty: 40 TABLET | Refills: 2 | Status: SHIPPED | OUTPATIENT
Start: 2017-05-18

## 2017-05-18 RX ORDER — OXYCODONE AND ACETAMINOPHEN 7.5; 325 MG/1; MG/1
1 TABLET ORAL EVERY 4 HOURS PRN
Qty: 50 TABLET | Refills: 0 | Status: SHIPPED | OUTPATIENT
Start: 2017-05-18 | End: 2017-05-25 | Stop reason: SDUPTHER

## 2017-05-18 RX ADMIN — ACETAMINOPHEN 1000 MG: 500 TABLET, FILM COATED ORAL at 09:14

## 2017-05-18 RX ADMIN — PANTOPRAZOLE SODIUM 40 MG: 40 TABLET, DELAYED RELEASE ORAL at 09:13

## 2017-05-18 RX ADMIN — OXYCODONE HYDROCHLORIDE 10 MG: 5 TABLET ORAL at 09:50

## 2017-05-18 RX ADMIN — ACETAMINOPHEN 1000 MG: 500 TABLET, FILM COATED ORAL at 04:56

## 2017-05-18 RX ADMIN — GABAPENTIN 600 MG: 300 CAPSULE ORAL at 13:53

## 2017-05-18 RX ADMIN — DOCUSATE SODIUM AND SENNOSIDES 2 TABLET: 8.6; 5 TABLET, FILM COATED ORAL at 09:13

## 2017-05-18 RX ADMIN — GABAPENTIN 600 MG: 300 CAPSULE ORAL at 09:13

## 2017-05-18 RX ADMIN — ASPIRIN 325 MG: 325 TABLET, COATED ORAL at 04:56

## 2017-05-18 RX ADMIN — LEVOTHYROXINE SODIUM 150 MCG: 150 TABLET ORAL at 09:13

## 2017-05-18 RX ADMIN — OXYCODONE HYDROCHLORIDE 10 MG: 5 TABLET ORAL at 13:53

## 2017-05-18 RX ADMIN — OXYCODONE HYDROCHLORIDE 10 MG: 5 TABLET ORAL at 00:55

## 2017-05-18 RX ADMIN — OXYCODONE HYDROCHLORIDE 10 MG: 5 TABLET ORAL at 04:56

## 2017-05-24 ENCOUNTER — TELEPHONE (OUTPATIENT)
Dept: ORTHOPEDIC SURGERY | Facility: CLINIC | Age: 62
End: 2017-05-24

## 2017-05-24 RX ORDER — DOCUSATE SODIUM 50 MG/5ML
150 LIQUID ORAL DAILY
Status: SHIPPED | OUTPATIENT
Start: 2017-05-24 | End: 2017-06-07

## 2017-05-25 RX ORDER — OXYCODONE AND ACETAMINOPHEN 7.5; 325 MG/1; MG/1
1 TABLET ORAL EVERY 4 HOURS PRN
Qty: 50 TABLET | Refills: 0 | Status: SHIPPED | OUTPATIENT
Start: 2017-05-25 | End: 2017-06-01

## 2017-06-01 ENCOUNTER — OFFICE VISIT (OUTPATIENT)
Dept: ORTHOPEDIC SURGERY | Facility: CLINIC | Age: 62
End: 2017-06-01

## 2017-06-01 DIAGNOSIS — M16.11 OSTEOARTHRITIS OF ONE HIP, RIGHT: Primary | ICD-10-CM

## 2017-06-01 DIAGNOSIS — Z96.641 STATUS POST TOTAL REPLACEMENT OF RIGHT HIP: ICD-10-CM

## 2017-06-01 PROCEDURE — 99024 POSTOP FOLLOW-UP VISIT: CPT | Performed by: ORTHOPAEDIC SURGERY

## 2017-06-01 RX ORDER — DOCUSATE SODIUM 50 MG/5ML
150 LIQUID ORAL DAILY
Status: SHIPPED | OUTPATIENT
Start: 2017-06-01

## 2017-06-01 RX ORDER — DOCUSATE SODIUM 100 MG/1
100 CAPSULE, LIQUID FILLED ORAL DAILY
Qty: 30 CAPSULE | Refills: 2 | Status: SHIPPED | OUTPATIENT
Start: 2017-06-01 | End: 2017-06-08

## 2017-06-01 RX ORDER — GABAPENTIN 600 MG/1
600 TABLET ORAL 4 TIMES DAILY
Qty: 120 TABLET | Refills: 1 | Status: SHIPPED | OUTPATIENT
Start: 2017-06-01 | End: 2017-07-05 | Stop reason: SDUPTHER

## 2017-06-01 RX ORDER — DOXYCYCLINE HYCLATE 100 MG/1
100 CAPSULE ORAL 2 TIMES DAILY
Qty: 30 CAPSULE | Refills: 1 | Status: SHIPPED | OUTPATIENT
Start: 2017-06-01 | End: 2017-06-01 | Stop reason: SDUPTHER

## 2017-06-01 RX ORDER — GABAPENTIN 600 MG/1
600 TABLET ORAL 4 TIMES DAILY
Qty: 120 TABLET | Refills: 1 | Status: SHIPPED | OUTPATIENT
Start: 2017-06-01 | End: 2017-06-01 | Stop reason: SDUPTHER

## 2017-06-01 RX ORDER — OXYCODONE HYDROCHLORIDE AND ACETAMINOPHEN 5; 325 MG/1; MG/1
1 TABLET ORAL EVERY 6 HOURS PRN
Qty: 50 TABLET | Refills: 0 | Status: SHIPPED | OUTPATIENT
Start: 2017-06-01 | End: 2017-06-21

## 2017-06-01 RX ORDER — DOXYCYCLINE HYCLATE 100 MG/1
100 CAPSULE ORAL 2 TIMES DAILY
Qty: 30 CAPSULE | Refills: 1 | Status: SHIPPED | OUTPATIENT
Start: 2017-06-01 | End: 2017-06-21

## 2017-06-01 NOTE — PROGRESS NOTES
Subjective:status post right total hip     Patient ID: Anderson Hernandez is a 62 y.o. female.    Chief Complaint:    History of Present Illnesspatient is 2 weeks out the above-stated procedure doing well ambulating independently with a walker.  All of her preoperative pain has resolved.       Social History     Occupational History   • Not on file.     Social History Main Topics   • Smoking status: Never Smoker   • Smokeless tobacco: Never Used   • Alcohol use No   • Drug use: No   • Sexual activity: Defer      Review of Systems   Constitutional: Negative for chills, diaphoresis, fever and unexpected weight change.   HENT: Negative for hearing loss, nosebleeds, sore throat and tinnitus.    Eyes: Negative for pain and visual disturbance.   Respiratory: Negative for cough, shortness of breath and wheezing.    Cardiovascular: Negative for chest pain and palpitations.   Gastrointestinal: Negative for abdominal pain, diarrhea, nausea and vomiting.   Endocrine: Negative for cold intolerance, heat intolerance and polydipsia.   Genitourinary: Negative for difficulty urinating, dysuria and hematuria.   Musculoskeletal: Positive for myalgias. Negative for joint swelling.   Skin: Negative for rash and wound.   Allergic/Immunologic: Negative for environmental allergies.   Neurological: Negative for dizziness, syncope and numbness.   Hematological: Does not bruise/bleed easily.   Psychiatric/Behavioral: Negative for dysphoric mood and sleep disturbance. The patient is not nervous/anxious.    All other systems reviewed and are negative.        Past Medical History:   Diagnosis Date   • Arthritis    • Disease of thyroid gland    • Fibromyalgia    • Knee pain    • PONV (postoperative nausea and vomiting)    • Shoulder pain    • Stomach ulcer    • Tendinitis of knee      Past Surgical History:   Procedure Laterality Date   • CARPAL TUNNEL RELEASE     • GALLBLADDER SURGERY     • KNEE SURGERY     • PARTIAL HYSTERECTOMY     •  TONSILLECTOMY     • TOTAL HIP ARTHROPLASTY Right 5/16/2017    Procedure: TOTAL HIP ARTHROPLASTY;  Surgeon: Rickie Delgado MD;  Location: [facility];  Service:      Family History   Problem Relation Age of Onset   • Lung disease Father    • Cancer Father    • Cancer Sister    • Lung disease Brother    • Cancer Brother    • Broken bones Maternal Grandmother          Objective:  There were no vitals filed for this visit.  There were no vitals filed for this visit.  There is no height or weight on file to calculate BMI.       Ortho Exam  he is alert and oriented ×3.  The wound is completely benign no erythema no drainage.  Some redness in the buttock area with his no motor deficit good distal pulses.  No swelling.  Calf is nontender.    Assessment:       1. Osteoarthritis of one hip, right    2. Status post total replacement of right hip          Plan:      continue the Percocet but dropped down to 5 mg.  Continue the gabapentin.  We'll put her on doxycycline prophylactically for the next 2 weeks. Return in 2 weeks with an x-ray of the hip.  He can drop her aspirin to once a day.      Work Status:    [facility] query complete.    Orders:  No orders of the defined types were placed in this encounter.      Medications:  New Medications Ordered This Visit   Medications   • gabapentin (NEURONTIN) 600 MG tablet     Sig: Take 1 tablet by mouth 4 (Four) Times a Day.     Dispense:  120 tablet     Refill:  1   • oxyCODONE-acetaminophen (PERCOCET) 5-325 MG per tablet     Sig: Take 1 tablet by mouth Every 6 (Six) Hours As Needed for Moderate Pain (4-6) or Severe Pain (7-10).     Dispense:  50 tablet     Refill:  0   • doxycycline (VIBRAMYCIN) 100 MG capsule     Sig: Take 1 capsule by mouth 2 (Two) Times a Day.     Dispense:  30 capsule     Refill:  1   • Docusate Sodium 150 mg       Followup:  Return in about 2 weeks (around 6/15/2017).          Dragon transcription disclaimer     Much of this encounter note is an electronic  transcription/translation of spoken language to printed text. The electronic translation of spoken language may permit erroneous, or at times, nonsensical words or phrases to be inadvertently transcribed. Although I have reviewed the note for such errors, some may still exist.

## 2017-06-06 ENCOUNTER — TELEPHONE (OUTPATIENT)
Dept: HOSPITALIST | Facility: HOSPITAL | Age: 62
End: 2017-06-06

## 2017-06-07 ENCOUNTER — TELEPHONE (OUTPATIENT)
Dept: ORTHOPEDIC SURGERY | Facility: CLINIC | Age: 62
End: 2017-06-07

## 2017-06-07 NOTE — TELEPHONE ENCOUNTER
If there is no redness around the wound she can stop all antibiotics.  If it still red she needs to come in to be checked

## 2017-06-07 NOTE — TELEPHONE ENCOUNTER
Pt states she just saw her physical therapist today and they think her hip is infected.  She lives over 2 hours away, so she will come in tomorrow at 11 to be checked.   Just wanted to give you a heads up.

## 2017-06-08 ENCOUNTER — LAB (OUTPATIENT)
Dept: LAB | Facility: HOSPITAL | Age: 62
End: 2017-06-08
Attending: ORTHOPAEDIC SURGERY

## 2017-06-08 ENCOUNTER — OFFICE VISIT (OUTPATIENT)
Dept: ORTHOPEDIC SURGERY | Facility: CLINIC | Age: 62
End: 2017-06-08

## 2017-06-08 VITALS — BODY MASS INDEX: 35.26 KG/M2 | HEIGHT: 60 IN | WEIGHT: 179.6 LBS

## 2017-06-08 DIAGNOSIS — Z96.641 STATUS POST TOTAL REPLACEMENT OF RIGHT HIP: ICD-10-CM

## 2017-06-08 DIAGNOSIS — Z96.641 STATUS POST TOTAL REPLACEMENT OF RIGHT HIP: Primary | ICD-10-CM

## 2017-06-08 PROCEDURE — 99024 POSTOP FOLLOW-UP VISIT: CPT | Performed by: ORTHOPAEDIC SURGERY

## 2017-06-08 PROCEDURE — 87086 URINE CULTURE/COLONY COUNT: CPT

## 2017-06-08 RX ORDER — CIPROFLOXACIN 500 MG/1
500 TABLET, FILM COATED ORAL EVERY 12 HOURS SCHEDULED
Status: SHIPPED | OUTPATIENT
Start: 2017-06-08 | End: 2017-06-13

## 2017-06-08 NOTE — PROGRESS NOTES
Subjective: Status post right total hip, possible UTI     Patient ID: Anderson Hernandez is a 62 y.o. female.    Chief Complaint:    History of Present Illness 62-year-old female returns early as her therapist is concerned she is developing an infection in her right total hip.  When last seen by me and very mild erythema was placed on doxycycline she believes the doxycycline is causing her to develop a UTI.  She was instructed to stop the doxycycline is seen today.  She's not having increasing pain has not run any fevers is had no drainage from the hip.       Social History     Occupational History   • Not on file.     Social History Main Topics   • Smoking status: Never Smoker   • Smokeless tobacco: Never Used   • Alcohol use No   • Drug use: No   • Sexual activity: Defer      Review of Systems   Constitutional: Negative for chills, diaphoresis, fever and unexpected weight change.   HENT: Negative for hearing loss, nosebleeds, sore throat and tinnitus.    Eyes: Negative for pain and visual disturbance.   Respiratory: Negative for cough, shortness of breath and wheezing.    Cardiovascular: Negative for chest pain and palpitations.   Gastrointestinal: Negative for abdominal pain, diarrhea, nausea and vomiting.   Endocrine: Negative for cold intolerance, heat intolerance and polydipsia.   Genitourinary: Negative for difficulty urinating, dysuria and hematuria.   Musculoskeletal: Positive for arthralgias and myalgias. Negative for joint swelling.   Skin: Negative for rash and wound.   Allergic/Immunologic: Negative for environmental allergies.   Neurological: Negative for dizziness, syncope and numbness.   Hematological: Does not bruise/bleed easily.   Psychiatric/Behavioral: Negative for dysphoric mood and sleep disturbance. The patient is not nervous/anxious.          Past Medical History:   Diagnosis Date   • Arthritis    • Disease of thyroid gland    • Fibromyalgia    • Knee pain    • PONV (postoperative nausea and  vomiting)    • Shoulder pain    • Stomach ulcer    • Tendinitis of knee      Past Surgical History:   Procedure Laterality Date   • CARPAL TUNNEL RELEASE     • GALLBLADDER SURGERY     • KNEE SURGERY     • PARTIAL HYSTERECTOMY     • TONSILLECTOMY     • TOTAL HIP ARTHROPLASTY Right 5/16/2017    Procedure: TOTAL HIP ARTHROPLASTY;  Surgeon: Rickie Delgado MD;  Location: [facility];  Service:      Family History   Problem Relation Age of Onset   • Lung disease Father    • Cancer Father    • Cancer Sister    • Lung disease Brother    • Cancer Brother    • Broken bones Maternal Grandmother          Objective:  There were no vitals filed for this visit.  Last 3 weights    06/08/17  1045   Weight: 179 lb 9.6 oz (81.5 kg)     Body mass index is 35.08 kg/(m^2).       Ortho Exam  she is alert and oriented.  Examination of her wound shows the incision to be completely benign there is no erythema and no drainage whatsoever she does have a mild erythema around the the hip but appears less than on her last visit.  I see no sign of a deep wound infection, she has at worst a mild cellulitis.    Assessment:       1. Status post total replacement of right hip          Plan:      ominous set her road to the hospital for urinalysis is the she does have a UTI and see if we need to change her antibiotics.  I am going to put her on Cipro in the meantime I'll call her Monday with results of the urinalysis.  Is positive him any change her antibiotic is negative he may continue the Cipro.  Keep scheduled appointment in 2 weeks.  Discussed the treatment plan with the patient and she is in agreement.      Work Status:    ABDIRIZAK query complete.    Orders:  Orders Placed This Encounter   Procedures   • Urine Culture       Medications:  New Medications Ordered This Visit   Medications   • ciprofloxacin (CIPRO) tablet 500 mg       Followup:  Return in about 2 weeks (around 6/22/2017).          Dragon transcription disclaimer     Much of this encounter  note is an electronic transcription/translation of spoken language to printed text. The electronic translation of spoken language may permit erroneous, or at times, nonsensical words or phrases to be inadvertently transcribed. Although I have reviewed the note for such errors, some may still exist.

## 2017-06-10 LAB — BACTERIA SPEC AEROBE CULT: NO GROWTH

## 2017-06-13 ENCOUNTER — TELEPHONE (OUTPATIENT)
Dept: ORTHOPEDIC SURGERY | Facility: CLINIC | Age: 62
End: 2017-06-13

## 2017-06-13 NOTE — TELEPHONE ENCOUNTER
Called checking on her urine results.  She was told someone would call her on Monday but never got a call.    Dr. Delgado states urine culture was normal, if wound looked fine, she can stop the medication.  She said it looked fine therefore she will stop the Cipro and be seen in 2 wks.

## 2017-06-21 ENCOUNTER — OFFICE VISIT (OUTPATIENT)
Dept: ORTHOPEDIC SURGERY | Facility: CLINIC | Age: 62
End: 2017-06-21

## 2017-06-21 DIAGNOSIS — Z96.651 STATUS POST TOTAL RIGHT KNEE REPLACEMENT: ICD-10-CM

## 2017-06-21 DIAGNOSIS — R52 PAIN: Primary | ICD-10-CM

## 2017-06-21 DIAGNOSIS — Z96.641 STATUS POST TOTAL REPLACEMENT OF RIGHT HIP: ICD-10-CM

## 2017-06-21 PROCEDURE — 99024 POSTOP FOLLOW-UP VISIT: CPT | Performed by: ORTHOPAEDIC SURGERY

## 2017-06-21 PROCEDURE — 73502 X-RAY EXAM HIP UNI 2-3 VIEWS: CPT | Performed by: ORTHOPAEDIC SURGERY

## 2017-06-21 PROCEDURE — 73562 X-RAY EXAM OF KNEE 3: CPT | Performed by: ORTHOPAEDIC SURGERY

## 2017-06-21 RX ORDER — HYDROCODONE BITARTRATE AND ACETAMINOPHEN 7.5; 325 MG/1; MG/1
1 TABLET ORAL EVERY 4 HOURS PRN
Qty: 50 TABLET | Refills: 0 | Status: SHIPPED | OUTPATIENT
Start: 2017-06-21 | End: 2017-07-05 | Stop reason: SDUPTHER

## 2017-06-21 RX ORDER — MELOXICAM 7.5 MG/1
7.5 TABLET ORAL DAILY
Qty: 30 TABLET | Refills: 5 | Status: SHIPPED | OUTPATIENT
Start: 2017-06-21 | End: 2018-03-19 | Stop reason: SDUPTHER

## 2017-06-21 RX ORDER — CYCLOBENZAPRINE HCL 10 MG
10 TABLET ORAL NIGHTLY PRN
Qty: 90 TABLET | Refills: 3 | Status: SHIPPED | OUTPATIENT
Start: 2017-06-21

## 2017-06-21 NOTE — PROGRESS NOTES
Subjective: Status post right total hip, right total knee     Patient ID: Anderson Hernandez is a 62 y.o. female.    Chief Complaint:    History of Present Illness patient is 4 weeks status post right total knee by me and is doing fairly well although that some increasing pain in the hip and the knee she states that she started water therapy.  All of her preoperative pain is gone she is now having posterior buttock pain is some anterior knee pain.  The total knee was done by another physician several years ago.       Social History     Occupational History   • Not on file.     Social History Main Topics   • Smoking status: Never Smoker   • Smokeless tobacco: Never Used   • Alcohol use No   • Drug use: No   • Sexual activity: Defer      Review of Systems   Constitutional: Negative for chills, diaphoresis, fever and unexpected weight change.   HENT: Negative for hearing loss, nosebleeds, sore throat and tinnitus.    Eyes: Negative for pain and visual disturbance.   Respiratory: Negative for cough, shortness of breath and wheezing.    Cardiovascular: Negative for chest pain and palpitations.   Gastrointestinal: Negative for abdominal pain, diarrhea, nausea and vomiting.   Endocrine: Negative for cold intolerance, heat intolerance and polydipsia.   Genitourinary: Negative for difficulty urinating, dysuria and hematuria.   Musculoskeletal: Positive for arthralgias. Negative for joint swelling and myalgias.   Skin: Negative for rash and wound.   Allergic/Immunologic: Negative for environmental allergies.   Neurological: Negative for dizziness, syncope and numbness.   Hematological: Does not bruise/bleed easily.   Psychiatric/Behavioral: Negative for dysphoric mood and sleep disturbance. The patient is not nervous/anxious.    All other systems reviewed and are negative.        Past Medical History:   Diagnosis Date   • Arthritis    • Disease of thyroid gland    • Fibromyalgia    • Knee pain    • PONV (postoperative nausea and  vomiting)    • Shoulder pain    • Stomach ulcer    • Tendinitis of knee      Past Surgical History:   Procedure Laterality Date   • CARPAL TUNNEL RELEASE     • GALLBLADDER SURGERY     • KNEE SURGERY     • PARTIAL HYSTERECTOMY     • TONSILLECTOMY     • TOTAL HIP ARTHROPLASTY Right 5/16/2017    Procedure: TOTAL HIP ARTHROPLASTY;  Surgeon: Rickie Delgado MD;  Location: [facility];  Service:      Family History   Problem Relation Age of Onset   • Lung disease Father    • Cancer Father    • Cancer Sister    • Lung disease Brother    • Cancer Brother    • Broken bones Maternal Grandmother          Objective:  There were no vitals filed for this visit.  There were no vitals filed for this visit.  There is no height or weight on file to calculate BMI.       Ortho Exam  an AP of the pelvis shows perfect position of the implant.  Oblique view also shows perfect position.  No prior x-rays are comparison.  AP lateral sunrise view of the knee show excellent position of the implants with no change compared to previous x-rays on exam her wound is completely benign no erythema no drainage no swelling.  Her tenderness is in the posterior buttock musculature.  His no swelling noted to the upper or lower leg.  Calf is nontender with a negative Homans.  There is no sensory loss.  The knee is full range of motion with no instability at 0 90°.  No effusion swelling erythema to the knee.  She has good distal pulses no motor deficit.    Assessment:       1. Pain    2. Status post total replacement of right hip    3. Status post total right knee replacement          Plan:      I think she is overdoing it with her exercise that she's been doing water therapy twice weekly she is doing strengthening exercises every day and told her she needs to stop and back off of just doing water therapy.  Prescription for hydrocodone and Flexeril for muscle spasms and Mobic for an anti-inflammatory return to see me in 5-6 weeks no x-rays necessary.  All in  all I think she is doing quite well she is happy with the progress      Work Status:    ABDIRIZAK query complete.    Orders:  Orders Placed This Encounter   Procedures   • XR Hip With or Without Pelvis 2 - 3 View Right   • XR Knee 3 View Right       Medications:  New Medications Ordered This Visit   Medications   • cyclobenzaprine (FLEXERIL) 10 MG tablet     Sig: Take 1 tablet by mouth At Night As Needed for Muscle Spasms.     Dispense:  90 tablet     Refill:  3   • meloxicam (MOBIC) 7.5 MG tablet     Sig: Take 1 tablet by mouth Daily.     Dispense:  30 tablet     Refill:  5   • HYDROcodone-acetaminophen (NORCO) 7.5-325 MG per tablet     Sig: Take 1 tablet by mouth Every 4 (Four) Hours As Needed for Moderate Pain (4-6) or Severe Pain (7-10).     Dispense:  50 tablet     Refill:  0       Followup:  Return in about 6 weeks (around 8/2/2017).          Dragon transcription disclaimer     Much of this encounter note is an electronic transcription/translation of spoken language to printed text. The electronic translation of spoken language may permit erroneous, or at times, nonsensical words or phrases to be inadvertently transcribed. Although I have reviewed the note for such errors, some may still exist.

## 2017-07-05 RX ORDER — GABAPENTIN 600 MG/1
600 TABLET ORAL 4 TIMES DAILY
Qty: 120 TABLET | Refills: 1 | Status: SHIPPED | OUTPATIENT
Start: 2017-07-05

## 2017-07-05 RX ORDER — HYDROCODONE BITARTRATE AND ACETAMINOPHEN 7.5; 325 MG/1; MG/1
1 TABLET ORAL EVERY 4 HOURS PRN
Qty: 50 TABLET | Refills: 0 | Status: SHIPPED | OUTPATIENT
Start: 2017-07-05 | End: 2017-07-05

## 2017-07-05 RX ORDER — HYDROCODONE BITARTRATE AND ACETAMINOPHEN 5; 325 MG/1; MG/1
1 TABLET ORAL EVERY 4 HOURS PRN
Qty: 40 TABLET | Refills: 0 | Status: SHIPPED | OUTPATIENT
Start: 2017-07-05 | End: 2017-07-17 | Stop reason: SDUPTHER

## 2017-07-13 ENCOUNTER — TELEPHONE (OUTPATIENT)
Dept: ORTHOPEDIC SURGERY | Facility: CLINIC | Age: 62
End: 2017-07-13

## 2017-07-17 RX ORDER — HYDROCODONE BITARTRATE AND ACETAMINOPHEN 5; 325 MG/1; MG/1
1 TABLET ORAL EVERY 4 HOURS PRN
Qty: 40 TABLET | Refills: 0 | Status: SHIPPED | OUTPATIENT
Start: 2017-07-17 | End: 2017-11-22

## 2017-08-02 ENCOUNTER — OFFICE VISIT (OUTPATIENT)
Dept: ORTHOPEDIC SURGERY | Facility: CLINIC | Age: 62
End: 2017-08-02

## 2017-08-02 VITALS — BODY MASS INDEX: 32.59 KG/M2 | HEIGHT: 60 IN | WEIGHT: 166 LBS

## 2017-08-02 DIAGNOSIS — Z96.641 STATUS POST TOTAL REPLACEMENT OF RIGHT HIP: ICD-10-CM

## 2017-08-02 DIAGNOSIS — R52 PAIN: Primary | ICD-10-CM

## 2017-08-02 DIAGNOSIS — Z96.651 STATUS POST TOTAL RIGHT KNEE REPLACEMENT: ICD-10-CM

## 2017-08-02 PROCEDURE — 99024 POSTOP FOLLOW-UP VISIT: CPT | Performed by: ORTHOPAEDIC SURGERY

## 2017-08-02 RX ORDER — HYDROCODONE BITARTRATE AND ACETAMINOPHEN 10; 325 MG/1; MG/1
TABLET ORAL
COMMUNITY
Start: 2017-07-25

## 2017-08-02 NOTE — PROGRESS NOTES
Subjective: Status post right total hip     Patient ID: Anderson Hernandez is a 62 y.o. female.    Chief Complaint:    History of Present Illness patient is 10 weeks out continues to improve.  Her pain is resolving her only issue now is weakness of that right lower extremity.  Again all the preoperative pain has resolved.       Social History     Occupational History   • Not on file.     Social History Main Topics   • Smoking status: Never Smoker   • Smokeless tobacco: Never Used   • Alcohol use No   • Drug use: No   • Sexual activity: Defer      Review of Systems   Constitutional: Negative for chills, diaphoresis, fever and unexpected weight change.   HENT: Negative for hearing loss, nosebleeds, sore throat and tinnitus.    Eyes: Negative for pain and visual disturbance.   Respiratory: Negative for cough, shortness of breath and wheezing.    Cardiovascular: Negative for chest pain and palpitations.   Gastrointestinal: Negative for abdominal pain, diarrhea, nausea and vomiting.   Endocrine: Negative for cold intolerance, heat intolerance and polydipsia.   Genitourinary: Negative for difficulty urinating, dysuria and hematuria.   Musculoskeletal: Positive for arthralgias and myalgias. Negative for joint swelling.   Skin: Negative for rash and wound.   Allergic/Immunologic: Negative for environmental allergies.   Neurological: Negative for dizziness, syncope and numbness.   Hematological: Does not bruise/bleed easily.   Psychiatric/Behavioral: Negative for dysphoric mood and sleep disturbance. The patient is not nervous/anxious.          Past Medical History:   Diagnosis Date   • Arthritis    • Disease of thyroid gland    • Fibromyalgia    • Knee pain    • PONV (postoperative nausea and vomiting)    • Shoulder pain    • Stomach ulcer    • Tendinitis of knee      Past Surgical History:   Procedure Laterality Date   • CARPAL TUNNEL RELEASE     • GALLBLADDER SURGERY     • KNEE SURGERY     • PARTIAL HYSTERECTOMY     •  TONSILLECTOMY     • TOTAL HIP ARTHROPLASTY Right 5/16/2017    Procedure: TOTAL HIP ARTHROPLASTY;  Surgeon: Rickie Delgado MD;  Location: [facility];  Service:      Family History   Problem Relation Age of Onset   • Lung disease Father    • Cancer Father    • Cancer Sister    • Lung disease Brother    • Cancer Brother    • Broken bones Maternal Grandmother          Objective:  There were no vitals filed for this visit.  Last 3 weights    08/02/17  1330   Weight: 166 lb (75.3 kg)     Body mass index is 32.42 kg/(m^2).       Ortho Exam  she is alert and oriented ×3.  There is no motor deficit good distal pulses right lower extremity.  Calf is nontender negative Homans.  Skin is cool to touch.  Quad testing is still 3-1/2-4 out of 5.  Abductors are 3-1/2-4 out of 5.  She is tolerating the anti-inflammatory to no GI side effects.    Assessment:       1. Pain    2. Status post total replacement of right hip    3. Status post total right knee replacement          Plan:      she needs to continue working on strengthening exercises will continue physical therapy return to see me in 4-6 weeks      Work Status:    ABDIRIZAK query complete.    Orders:  Orders Placed This Encounter   Procedures   • Ambulatory Referral to Physical Therapy Evaluate and treat       Medications:      Followup:  Return in about 4 weeks (around 8/30/2017).          Dragon transcription disclaimer     Much of this encounter note is an electronic transcription/translation of spoken language to printed text. The electronic translation of spoken language may permit erroneous, or at times, nonsensical words or phrases to be inadvertently transcribed. Although I have reviewed the note for such errors, some may still exist.

## 2017-11-22 ENCOUNTER — OFFICE VISIT (OUTPATIENT)
Dept: ORTHOPEDIC SURGERY | Facility: CLINIC | Age: 62
End: 2017-11-22

## 2017-11-22 VITALS — HEIGHT: 60 IN | BODY MASS INDEX: 33.38 KG/M2 | WEIGHT: 170 LBS

## 2017-11-22 DIAGNOSIS — Z96.651 STATUS POST TOTAL RIGHT KNEE REPLACEMENT: ICD-10-CM

## 2017-11-22 DIAGNOSIS — R52 PAIN: Primary | ICD-10-CM

## 2017-11-22 DIAGNOSIS — Z96.641 STATUS POST TOTAL REPLACEMENT OF RIGHT HIP: ICD-10-CM

## 2017-11-22 PROCEDURE — 99214 OFFICE O/P EST MOD 30 MIN: CPT | Performed by: ORTHOPAEDIC SURGERY

## 2017-11-22 PROCEDURE — 73562 X-RAY EXAM OF KNEE 3: CPT | Performed by: ORTHOPAEDIC SURGERY

## 2017-11-22 RX ORDER — CHOLECALCIFEROL (VITAMIN D3) 1250 MCG
CAPSULE ORAL
COMMUNITY
Start: 2017-11-20

## 2017-11-22 NOTE — PROGRESS NOTES
Subjective: Status post right total knee arthroplasty, pain     Patient ID: Anderson Chapman is a 62 y.o. female.    Chief Complaint:    History of Present Illness patient returns for evaluation of her right total knee that was done 6 or 7 years ago by another physician.  She states she is having persistent pain and discomfort is had pain since the surgery.  She has pain getting in and out of a chair and navigating steps some mild discomfort at night.  She states his never been right the surgery       Social History     Occupational History   • Not on file.     Social History Main Topics   • Smoking status: Never Smoker   • Smokeless tobacco: Never Used   • Alcohol use No   • Drug use: No   • Sexual activity: Defer      Review of Systems   Constitutional: Negative for chills, diaphoresis, fever and unexpected weight change.   HENT: Negative for hearing loss, nosebleeds, sore throat and tinnitus.    Eyes: Negative for pain and visual disturbance.   Respiratory: Negative for cough, shortness of breath and wheezing.    Cardiovascular: Negative for chest pain and palpitations.   Gastrointestinal: Negative for abdominal pain, diarrhea, nausea and vomiting.   Endocrine: Negative for cold intolerance, heat intolerance and polydipsia.   Genitourinary: Negative for difficulty urinating, dysuria and hematuria.   Musculoskeletal: Positive for arthralgias and myalgias. Negative for joint swelling.   Skin: Negative for rash and wound.   Allergic/Immunologic: Negative for environmental allergies.   Neurological: Negative for dizziness, syncope and numbness.   Hematological: Does not bruise/bleed easily.   Psychiatric/Behavioral: Negative for dysphoric mood and sleep disturbance. The patient is not nervous/anxious.          Past Medical History:   Diagnosis Date   • Arthritis    • Disease of thyroid gland    • Fibromyalgia    • Knee pain    • PONV (postoperative nausea and vomiting)    • Shoulder pain    • Stomach ulcer    •  Tendinitis of knee      Past Surgical History:   Procedure Laterality Date   • CARPAL TUNNEL RELEASE     • GALLBLADDER SURGERY     • KNEE SURGERY     • PARTIAL HYSTERECTOMY     • TONSILLECTOMY     • TOTAL HIP ARTHROPLASTY Right 5/16/2017    Procedure: TOTAL HIP ARTHROPLASTY;  Surgeon: Rickie Delgado MD;  Location: [facility];  Service:      Family History   Problem Relation Age of Onset   • Lung disease Father    • Cancer Father    • Cancer Sister    • Lung disease Brother    • Cancer Brother    • Broken bones Maternal Grandmother          Objective:  There were no vitals filed for this visit.  Last 3 weights    11/22/17  1047   Weight: 170 lb (77.1 kg)     Body mass index is 33.2 kg/(m^2).       Ortho Exam  AP lateral sunrise view of the knee shows perfect position of the implant all 3 views.  No abnormality is seen.  No prior x-rays for comparison.  She is alert and oriented ×3.  The right knee shows no swelling effusion erythema.  The skin is cool to touch is no sensory loss.  Calf is nontender.  She lacks approximately 10° of extension as flexion about 90 maybe 95°.  There is no instability throughout the arc of motion.  Quad function is +4 out of 5 secondary to pain.  Been on anti-inflammatories which is offered no relief of her symptoms.    Assessment:       1. Pain    2. Status post total replacement of right hip    3. Status post total right knee replacement          Plan:      .  I reviewed her x-rays and physical findings with the patient.  I believe her pain is not due to component incompatibility she thinks but due to scar tissue and stiffness.  I will get a CT scan however first to be sure there is no loosening ML alignment of components and if that is negative her only option may be open wide scar excision of polyps change.  Return after the CT scan is been completed.  Work Status:    ABDIRIZAK query complete.    Orders:  Orders Placed This Encounter   Procedures   • XR Knee 3 View Right   • CT knee right  wo contrast       Medications:      Followup:  Return in about 1 week (around 11/29/2017).          Dragon transcription disclaimer     Much of this encounter note is an electronic transcription/translation of spoken language to printed text. The electronic translation of spoken language may permit erroneous, or at times, nonsensical words or phrases to be inadvertently transcribed. Although I have reviewed the note for such errors, some may still exist.

## 2017-11-27 ENCOUNTER — TELEPHONE (OUTPATIENT)
Dept: ORTHOPEDIC SURGERY | Facility: CLINIC | Age: 62
End: 2017-11-27

## 2017-11-27 NOTE — TELEPHONE ENCOUNTER
Patient scheduled at [facility], she could not make that day. I gave her  Phone number and ask her to call to reschedule then call our office to schedule followup.

## 2017-11-27 NOTE — TELEPHONE ENCOUNTER
[facility] [id number], FX [phone]

## 2017-12-06 ENCOUNTER — OFFICE VISIT (OUTPATIENT)
Dept: ORTHOPEDIC SURGERY | Facility: CLINIC | Age: 62
End: 2017-12-06

## 2017-12-06 DIAGNOSIS — R52 PAIN: ICD-10-CM

## 2017-12-06 DIAGNOSIS — Z96.651 STATUS POST TOTAL RIGHT KNEE REPLACEMENT: ICD-10-CM

## 2017-12-06 DIAGNOSIS — Z96.641 STATUS POST TOTAL REPLACEMENT OF RIGHT HIP: ICD-10-CM

## 2017-12-06 DIAGNOSIS — R52 PAIN: Primary | ICD-10-CM

## 2017-12-06 PROCEDURE — 20610 DRAIN/INJ JOINT/BURSA W/O US: CPT | Performed by: ORTHOPAEDIC SURGERY

## 2017-12-06 PROCEDURE — 99213 OFFICE O/P EST LOW 20 MIN: CPT | Performed by: ORTHOPAEDIC SURGERY

## 2017-12-06 RX ORDER — TRIAMCINOLONE ACETONIDE 40 MG/ML
40 INJECTION, SUSPENSION INTRA-ARTICULAR; INTRAMUSCULAR
Status: COMPLETED | OUTPATIENT
Start: 2017-12-06 | End: 2017-12-06

## 2017-12-06 RX ORDER — LIDOCAINE HYDROCHLORIDE 10 MG/ML
8 INJECTION, SOLUTION EPIDURAL; INFILTRATION; INTRACAUDAL; PERINEURAL
Status: COMPLETED | OUTPATIENT
Start: 2017-12-06 | End: 2017-12-06

## 2017-12-06 RX ADMIN — LIDOCAINE HYDROCHLORIDE 8 ML: 10 INJECTION, SOLUTION EPIDURAL; INFILTRATION; INTRACAUDAL; PERINEURAL at 15:49

## 2017-12-06 RX ADMIN — TRIAMCINOLONE ACETONIDE 40 MG: 40 INJECTION, SUSPENSION INTRA-ARTICULAR; INTRAMUSCULAR at 15:49

## 2017-12-06 NOTE — PROGRESS NOTES
Subjective: Right knee pain     Patient ID: Anderson LUND is a 62 y.o. female.    Chief Complaint:    History of Present Illness patient is seen follow-up to her right total knee.  CT scan ordered showed no abnormality.  Persistent having pain discomfort and she describes constant pain in the knee at rest and with activity.  States the knee has hurt since she had her surgery 6 or 7 years ago       Social History     Occupational History   • Not on file.     Social History Main Topics   • Smoking status: Never Smoker   • Smokeless tobacco: Never Used   • Alcohol use No   • Drug use: No   • Sexual activity: Defer      Review of Systems   Constitutional: Negative for chills, diaphoresis, fever and unexpected weight change.   HENT: Negative for hearing loss, nosebleeds, sore throat and tinnitus.    Eyes: Negative for pain and visual disturbance.   Respiratory: Negative for cough, shortness of breath and wheezing.    Cardiovascular: Negative for chest pain and palpitations.   Gastrointestinal: Negative for abdominal pain, diarrhea, nausea and vomiting.   Endocrine: Negative for cold intolerance, heat intolerance and polydipsia.   Genitourinary: Negative for difficulty urinating, dysuria and hematuria.   Musculoskeletal: Positive for arthralgias. Negative for joint swelling and myalgias.   Skin: Negative for rash and wound.   Allergic/Immunologic: Negative for environmental allergies.   Neurological: Negative for dizziness, syncope and numbness.   Hematological: Does not bruise/bleed easily.   Psychiatric/Behavioral: Negative for dysphoric mood and sleep disturbance. The patient is not nervous/anxious.    All other systems reviewed and are negative.        Past Medical History:   Diagnosis Date   • Arthritis    • Disease of thyroid gland    • Fibromyalgia    • Knee pain    • PONV (postoperative nausea and vomiting)    • Shoulder pain    • Stomach ulcer    • Tendinitis of knee      Past Surgical History:   Procedure  Laterality Date   • CARPAL TUNNEL RELEASE     • GALLBLADDER SURGERY     • KNEE SURGERY     • PARTIAL HYSTERECTOMY     • TONSILLECTOMY     • TOTAL HIP ARTHROPLASTY Right 5/16/2017    Procedure: TOTAL HIP ARTHROPLASTY;  Surgeon: Rickie Delgado MD;  Location: [facility];  Service:      Family History   Problem Relation Age of Onset   • Lung disease Father    • Cancer Father    • Cancer Sister    • Lung disease Brother    • Cancer Brother    • Broken bones Maternal Grandmother          Objective:  There were no vitals filed for this visit.  There were no vitals filed for this visit.  There is no height or weight on file to calculate BMI.       Ortho Exam  is alert and oriented ×3.  The right knee shows no swelling effusion erythema.  She lacks maybe 5° of extension and flex to 90 is no instability.  The skin is cool to touch.  His no motor deficit good distal pulses and his no sensory loss.  Her calf is nontender negative Homans.  Quad function is +4-1/2 out of 5.  She has been taking the meloxicam without any GI side effects but no decrease in his symptoms as far as her knee is concerned.    Assessment:       1. Pain    2. Status post total replacement of right hip    3. Status post total right knee replacement          Plan: Reviewed the CT scans with the patient.  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  After reviewing treatment options on going inject the knee today with 8 cc lidocaine 16 Kenalog and that was accomplished through the superior lateral portal about sterile prep without complications tolerating that well.  If she gets reasonable relief of her pain and discomfort evaluation of the knee possibly open wide excision of scar tissue may be indicated but does absolutely nothing I think her only option is pain management return to see me in 3 weeks  Large Joint Arthrocentesis  Date/Time: 12/6/2017  3:49 PM  Consent given by: patient  Site marked: site marked  Supporting Documentation  Indications: pain   Procedure Details  Location: knee - R knee  Needle size: 22 G  Approach: superior  Medications administered: 8 mL lidocaine PF 1% 1 %; 40 mg triamcinolone acetonide 40 MG/ML  Patient tolerance: patient tolerated the procedure well with no immediate complications                Work Status:    ABDIRIZAK query complete.    Orders:  Orders Placed This Encounter   Procedures   • Large Joint Arthrocentesis       Medications:  No orders of the defined types were placed in this encounter.      Followup:  Return in about 4 weeks (around 1/3/2018).          Dragon transcription disclaimer     Much of this encounter note is an electronic transcription/translation of spoken language to printed text. The electronic translation of spoken language may permit erroneous, or at times, nonsensical words or phrases to be inadvertently transcribed. Although I have reviewed the note for such errors, some may still exist.

## 2018-03-19 ENCOUNTER — OFFICE VISIT (OUTPATIENT)
Dept: ORTHOPEDIC SURGERY | Facility: CLINIC | Age: 63
End: 2018-03-19

## 2018-03-19 DIAGNOSIS — S76.011A HIP STRAIN, RIGHT, INITIAL ENCOUNTER: ICD-10-CM

## 2018-03-19 DIAGNOSIS — Z96.641 STATUS POST TOTAL REPLACEMENT OF RIGHT HIP: ICD-10-CM

## 2018-03-19 DIAGNOSIS — R52 PAIN: Primary | ICD-10-CM

## 2018-03-19 PROCEDURE — 73502 X-RAY EXAM HIP UNI 2-3 VIEWS: CPT | Performed by: ORTHOPAEDIC SURGERY

## 2018-03-19 PROCEDURE — 99214 OFFICE O/P EST MOD 30 MIN: CPT | Performed by: ORTHOPAEDIC SURGERY

## 2018-03-19 RX ORDER — MELOXICAM 7.5 MG/1
7.5 TABLET ORAL DAILY
Qty: 30 TABLET | Refills: 5 | Status: SHIPPED | OUTPATIENT
Start: 2018-03-19

## 2018-03-19 RX ORDER — METHYLPREDNISOLONE 4 MG/1
TABLET ORAL
Qty: 21 TABLET | Refills: 0 | Status: SHIPPED | OUTPATIENT
Start: 2018-03-19

## 2018-03-19 NOTE — PROGRESS NOTES
Subjective: Right hip pain     Patient ID: Anderson Chapman is a 62 y.o. female.    Chief Complaint:    History of Present Illness 62-year-old female known to me having undergone a right total hip arthroplasty May 2017 seen today for new problem regarding that hip.  States she is completely asymptomatic until last Wednesday while cleaning her yard stepped in a hole and developed right hip pain that has persisted.  She has significant right groin pain with activity.  Again prior to this episode she is completely asymptomatic.  She now walks with an antalgic gait describes the pain is anywhere from having her 8 out of 10 with activity       Social History     Occupational History   • Not on file.     Social History Main Topics   • Smoking status: Never Smoker   • Smokeless tobacco: Never Used   • Alcohol use No   • Drug use: No   • Sexual activity: Defer      Review of Systems   Constitutional: Negative for chills, diaphoresis, fever and unexpected weight change.   HENT: Negative for hearing loss, nosebleeds, sore throat and tinnitus.    Eyes: Negative for pain and visual disturbance.   Respiratory: Negative for cough, shortness of breath and wheezing.    Cardiovascular: Negative for chest pain and palpitations.   Gastrointestinal: Negative for abdominal pain, diarrhea, nausea and vomiting.   Endocrine: Negative for cold intolerance, heat intolerance and polydipsia.   Genitourinary: Negative for difficulty urinating, dysuria and hematuria.   Musculoskeletal: Positive for arthralgias. Negative for joint swelling and myalgias.   Skin: Negative for rash and wound.   Allergic/Immunologic: Negative for environmental allergies.   Neurological: Negative for dizziness, syncope and numbness.   Hematological: Does not bruise/bleed easily.   Psychiatric/Behavioral: Negative for dysphoric mood and sleep disturbance. The patient is not nervous/anxious.    All other systems reviewed and are negative.        Past Medical History:    Diagnosis Date   • Arthritis    • Disease of thyroid gland    • Fibromyalgia    • Knee pain    • PONV (postoperative nausea and vomiting)    • Shoulder pain    • Stomach ulcer    • Tendinitis of knee      Past Surgical History:   Procedure Laterality Date   • CARPAL TUNNEL RELEASE     • GALLBLADDER SURGERY     • KNEE SURGERY     • PARTIAL HYSTERECTOMY     • TONSILLECTOMY     • TOTAL HIP ARTHROPLASTY Right 5/16/2017    Procedure: TOTAL HIP ARTHROPLASTY;  Surgeon: Rickie Delgado MD;  Location: [facility];  Service:      Family History   Problem Relation Age of Onset   • Lung disease Father    • Cancer Father    • Cancer Sister    • Lung disease Brother    • Cancer Brother    • Broken bones Maternal Grandmother          Objective:  There were no vitals filed for this visit.  There were no vitals filed for this visit.  There is no height or weight on file to calculate BMI.       Ortho Exam  AP of the pelvis to evaluate her total hip is completely within normal limits showing no change in the position of the implant compared to previous x-rays.  Implants are well-positioned.  She is alert and oriented ×3.  The right leg shows no swelling or effusion.  His no motor deficit no sensory loss in the leg.  She has full range of motion of the knee.  Her calf is nontender negative Homans.  Is marked pain to palpation over the proximal quad tendons or hip flexors.  In extension of the leg passively and against gravity is markedly painful.  Mild pain with passive internal/external rotation.  There is pain with abduction of localized over the hip flexors anteriorly.  She is not taking any anti-inflammatories which she has taken some the past and tolerated it well.    Assessment:       1. Pain    2. Status post total replacement of right hip    3. Hip strain, right, initial encounter          Plan:      spent almost 20 minutes with the patient reviewing her x-rays and physical findings.  The implant is well-positioned and see no  change in position I don't think the problem is with the implants but she strained the muscles the hip flexors when she stepped into the hole.  There is no gross instability.  I would think she tore the tendon but irritated the muscle and tendons themselves.  We'll prescribed a steroid pack followed by resuming Mobitz 7.5 daily.  Return in 3 weeks if still symptomatic patient was also advised on heat.  Discussed this treatment plan with the patient and she was in agreement      Work Status:    ABDIRIZAK query complete.    Orders:  Orders Placed This Encounter   Procedures   • XR Hip With or Without Pelvis 2 - 3 View Right       Medications:  New Medications Ordered This Visit   Medications   • meloxicam (MOBIC) 7.5 MG tablet     Sig: Take 1 tablet by mouth Daily.     Dispense:  30 tablet     Refill:  5   • MethylPREDNISolone (MEDROL, LEV,) 4 MG tablet     Sig: Use as directed by package instructions     Dispense:  21 tablet     Refill:  0       Followup:  Return in about 3 weeks (around 4/9/2018).          Dragon transcription disclaimer     Much of this encounter note is an electronic transcription/translation of spoken language to printed text. The electronic translation of spoken language may permit erroneous, or at times, nonsensical words or phrases to be inadvertently transcribed. Although I have reviewed the note for such errors, some may still exist.

## (undated) DEVICE — SYS SKIN CLS DERMABOND PRINEO W/60CM MESH TP

## (undated) DEVICE — COVER,MAYO STAND,STERILE: Brand: MEDLINE

## (undated) DEVICE — 3M™ MEDIPORE™ H SOFT CLOTH SURGICAL TAPE 2864, 4 INCH X 10 YARD (10CM X 9,14M), 12 ROLLS/CASE: Brand: 3M™ MEDIPORE™

## (undated) DEVICE — DRAPE,HIP,W/POUCHES,STERILE: Brand: MEDLINE

## (undated) DEVICE — GLV SURG SENSICARE MICRO PF LF 8.5 STRL

## (undated) DEVICE — ELECTRD BLD EXT EDGE 1P COAT 6.5IN STRL

## (undated) DEVICE — INTENDED FOR TISSUE SEPARATION, AND OTHER PROCEDURES THAT REQUIRE A SHARP SURGICAL BLADE TO PUNCTURE OR CUT.: Brand: BARD-PARKER ® DISPOSABLE SCALPELS

## (undated) DEVICE — SPNG LAP 18X18IN LF STRL PK/5

## (undated) DEVICE — DRAPE,U/ SHT,SPLIT,PLAS,STERIL: Brand: MEDLINE

## (undated) DEVICE — GLV SURG SENSICARE ORTHO PF LF 8 STRL

## (undated) DEVICE — PREP SOL POVIDONE/IODINE BT 4OZ

## (undated) DEVICE — DRSNG PAD ABD 8X10IN STRL

## (undated) DEVICE — IRRIGATOR BULB 60CC

## (undated) DEVICE — STCKNT IMPERV 14IN STRL

## (undated) DEVICE — GLV SURG EUDERMIC PF LTX 8 STRL

## (undated) DEVICE — TP SILK DURAPORE 2 IN

## (undated) DEVICE — 2108 SERIES SAGITTAL BLADE AGGRESSIVE  (25.0 X 0.89 X 89.5MM)

## (undated) DEVICE — SUT NUROLON 1 OS8 CR3 18IN C595T

## (undated) DEVICE — HOOD, PEEL-AWAY: Brand: FLYTE

## (undated) DEVICE — TRY SKINPREP WET CHG 4PCT SPNG HIB

## (undated) DEVICE — COVER,TABLE,HEAVY DUTY,79"X110",STRL: Brand: MEDLINE

## (undated) DEVICE — APPL CHLORAPREP W/TINT 26ML ORNG

## (undated) DEVICE — PK BASIC ORTHO 90

## (undated) DEVICE — SUT VIC TP1 SZ2 27IN J849G

## (undated) DEVICE — DECANT BG O JET

## (undated) DEVICE — TRANSPOSAL ULTRAFLEX DUO/QUAD ULTRA CART MANIFOLD

## (undated) DEVICE — SYR CONTRL LUERLOK 10CC

## (undated) DEVICE — NDL SPINE 22G 31/2IN BLK

## (undated) DEVICE — FRAZIER SUCTION INSTRUMENT 10 FR W/CONTROL VENT & OBTURATOR: Brand: FRAZIER

## (undated) DEVICE — CONN TBG Y 5 IN 1 LF STRL

## (undated) DEVICE — DRP Z/FRICTION 10X16IN

## (undated) DEVICE — DECANTER: Brand: UNBRANDED

## (undated) DEVICE — Device

## (undated) DEVICE — 3M™ IOBAN™ 2 ANTIMICROBIAL INCISE DRAPE 6651EZ: Brand: IOBAN™ 2

## (undated) DEVICE — 3M™ STERI-DRAPE™ INSTRUMENT POUCH 1018: Brand: STERI-DRAPE™

## (undated) DEVICE — SPNG LAP PREWASH SFTPK 18X18IN 5PK STRL

## (undated) DEVICE — HOOD: Brand: FLYTE

## (undated) DEVICE — TOWEL,OR,DSP,ST,BLUE,STD,4/PK,20PK/CS: Brand: MEDLINE

## (undated) DEVICE — BOWL PLSTC LG 32OZ BLU STRL

## (undated) DEVICE — CONTAINER,SPECIMEN,OR STERILE,4OZ: Brand: MEDLINE

## (undated) DEVICE — GLV SURG NEOLON 2G PF LF 8 STRL

## (undated) DEVICE — GLV SURG NEOLON 2G PF LF 8.5 STRL

## (undated) DEVICE — DRAPE,REIN 53X77,STERILE: Brand: MEDLINE

## (undated) DEVICE — UNDYED BRAIDED (POLYGLACTIN 910), SYNTHETIC ABSORBABLE SUTURE: Brand: COATED VICRYL

## (undated) DEVICE — TOTL HIP GRIPTION CUP DEPUY UPCHRG
Type: IMPLANTABLE DEVICE | Site: ACETABULUM | Status: NON-FUNCTIONAL
Removed: 2017-05-16

## (undated) DEVICE — PILLW ABD SM